# Patient Record
Sex: FEMALE | Race: BLACK OR AFRICAN AMERICAN | Employment: UNEMPLOYED | URBAN - METROPOLITAN AREA
[De-identification: names, ages, dates, MRNs, and addresses within clinical notes are randomized per-mention and may not be internally consistent; named-entity substitution may affect disease eponyms.]

---
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
## 2019-05-08 PROBLEM — N93.9 ABNORMAL UTERINE BLEEDING (AUB): Status: ACTIVE | Noted: 2019-05-08

## (undated) DEVICE — PENCIL ES L3M BTTN SWCH S STL HEX LOK BLDE ELECTRD HOLSTER

## (undated) DEVICE — SUTURE VCRL SZ 4-0 L27IN ABSRB UD L60MM KS STR REV CUT NDL J662H

## (undated) DEVICE — SUTURE VCRL SZ 1 L27IN ABSRB UD CT-1 L36MM 1/2 CIR J261H

## (undated) DEVICE — SUTURE PDS II SZ 0 L27IN ABSRB VLT L36MM CT-1 1/2 CIR Z340H

## (undated) DEVICE — REM POLYHESIVE ADULT PATIENT RETURN ELECTRODE: Brand: VALLEYLAB

## (undated) DEVICE — (D)PREP SKN CHLRAPRP APPL 26ML -- CONVERT TO ITEM 371833

## (undated) DEVICE — AMD ANTIMICROBIAL NON-ADHERENT PAD,0.2% POLYHEXAMETHYLENE BIGUANIDE HCI (PHMB): Brand: TELFA

## (undated) DEVICE — SOLUTION IV 1000ML 0.9% SOD CHL

## (undated) DEVICE — SURGICAL PROCEDURE PACK C SECT CDS

## (undated) DEVICE — AMD ANTIMICROBIAL GAUZE SPONGES,12 PLY USP TYPE VII, 0.2% POLYHEXAMETHYLENE BIGUANIDE HCI (PHMB): Brand: CURITY

## (undated) DEVICE — DERMABOND SKIN ADH 0.7ML -- DERMABOND ADVANCED 12/BX

## (undated) DEVICE — KENDALL SCD EXPRESS SLEEVES, KNEE LENGTH, MEDIUM: Brand: KENDALL SCD

## (undated) DEVICE — SUTURE PLN GUT SZ 2-0 L27IN ABSRB YELLOWISH TAN L40MM CT 853H

## (undated) DEVICE — CATH FOL TY IC BAG 16FR 2000ML -- CONVERT TO ITEM 363158

## (undated) DEVICE — (D)STRIP SKN CLSR 0.5X4IN WHT --

## (undated) DEVICE — Device: Brand: PORTEX

## (undated) DEVICE — SUTURE 2-0 L36IN ABSRB BRN CT L40MM 1/2 CIR TAPERPOINT SGL 913H

## (undated) DEVICE — STERILE POLYISOPRENE POWDER-FREE SURGICAL GLOVES: Brand: PROTEXIS

## (undated) DEVICE — SOLUTION IRRIG 1000ML H2O STRL BLT

## (undated) DEVICE — SUT CHRMC 1 36IN CTX BRN --

## (undated) DEVICE — MEDI-VAC NON-CONDUCTIVE SUCTION TUBING: Brand: CARDINAL HEALTH